# Patient Record
Sex: MALE | Race: OTHER | HISPANIC OR LATINO | URBAN - METROPOLITAN AREA
[De-identification: names, ages, dates, MRNs, and addresses within clinical notes are randomized per-mention and may not be internally consistent; named-entity substitution may affect disease eponyms.]

---

## 2017-12-28 ENCOUNTER — EMERGENCY (EMERGENCY)
Facility: HOSPITAL | Age: 37
LOS: 1 days | Discharge: HOME | End: 2017-12-28
Attending: EMERGENCY MEDICINE | Admitting: EMERGENCY MEDICINE
Payer: COMMERCIAL

## 2017-12-28 VITALS
HEART RATE: 104 BPM | OXYGEN SATURATION: 98 % | RESPIRATION RATE: 15 BRPM | SYSTOLIC BLOOD PRESSURE: 137 MMHG | TEMPERATURE: 99 F | DIASTOLIC BLOOD PRESSURE: 88 MMHG

## 2017-12-28 PROCEDURE — 99284 EMERGENCY DEPT VISIT MOD MDM: CPT

## 2017-12-28 RX ORDER — ASPIRIN/CALCIUM CARB/MAGNESIUM 324 MG
325 TABLET ORAL ONCE
Qty: 0 | Refills: 0 | Status: COMPLETED | OUTPATIENT
Start: 2017-12-28 | End: 2017-12-28

## 2017-12-28 RX ADMIN — Medication 325 MILLIGRAM(S): at 23:30

## 2017-12-28 NOTE — ED ADULT TRIAGE NOTE - CHIEF COMPLAINT QUOTE
Pt c/o L sided rib pain starting the 25th of December with cough for approx 1.5 weeks.  Pt denies any SOB.  Respirations even and unlabored on room air.  Pt denies any dizziness/lightheadedness.  Pt appears comfortable.  Denies any PHMx.

## 2017-12-29 VITALS
RESPIRATION RATE: 15 BRPM | OXYGEN SATURATION: 99 % | DIASTOLIC BLOOD PRESSURE: 73 MMHG | SYSTOLIC BLOOD PRESSURE: 123 MMHG | TEMPERATURE: 99 F | HEART RATE: 92 BPM

## 2017-12-29 LAB
ALBUMIN SERPL ELPH-MCNC: 4.5 G/DL — SIGNIFICANT CHANGE UP (ref 3.3–5)
ALP SERPL-CCNC: 90 U/L — SIGNIFICANT CHANGE UP (ref 40–120)
ALT FLD-CCNC: 84 U/L — HIGH (ref 4–41)
AST SERPL-CCNC: 38 U/L — SIGNIFICANT CHANGE UP (ref 4–40)
BASOPHILS # BLD AUTO: 0.04 K/UL — SIGNIFICANT CHANGE UP (ref 0–0.2)
BASOPHILS NFR BLD AUTO: 0.4 % — SIGNIFICANT CHANGE UP (ref 0–2)
BILIRUB SERPL-MCNC: 0.4 MG/DL — SIGNIFICANT CHANGE UP (ref 0.2–1.2)
BUN SERPL-MCNC: 18 MG/DL — SIGNIFICANT CHANGE UP (ref 7–23)
CALCIUM SERPL-MCNC: 9.4 MG/DL — SIGNIFICANT CHANGE UP (ref 8.4–10.5)
CHLORIDE SERPL-SCNC: 102 MMOL/L — SIGNIFICANT CHANGE UP (ref 98–107)
CK MB BLD-MCNC: 1.33 NG/ML — SIGNIFICANT CHANGE UP (ref 1–6.6)
CK MB BLD-MCNC: SIGNIFICANT CHANGE UP (ref 0–2.5)
CK SERPL-CCNC: 126 U/L — SIGNIFICANT CHANGE UP (ref 30–200)
CO2 SERPL-SCNC: 24 MMOL/L — SIGNIFICANT CHANGE UP (ref 22–31)
CREAT SERPL-MCNC: 0.84 MG/DL — SIGNIFICANT CHANGE UP (ref 0.5–1.3)
EOSINOPHIL # BLD AUTO: 0.86 K/UL — HIGH (ref 0–0.5)
EOSINOPHIL NFR BLD AUTO: 8.4 % — HIGH (ref 0–6)
GLUCOSE SERPL-MCNC: 121 MG/DL — HIGH (ref 70–99)
HCT VFR BLD CALC: 45.1 % — SIGNIFICANT CHANGE UP (ref 39–50)
HGB BLD-MCNC: 15.2 G/DL — SIGNIFICANT CHANGE UP (ref 13–17)
IMM GRANULOCYTES # BLD AUTO: 0.06 # — SIGNIFICANT CHANGE UP
IMM GRANULOCYTES NFR BLD AUTO: 0.6 % — SIGNIFICANT CHANGE UP (ref 0–1.5)
LYMPHOCYTES # BLD AUTO: 2.88 K/UL — SIGNIFICANT CHANGE UP (ref 1–3.3)
LYMPHOCYTES # BLD AUTO: 28.1 % — SIGNIFICANT CHANGE UP (ref 13–44)
MCHC RBC-ENTMCNC: 30.5 PG — SIGNIFICANT CHANGE UP (ref 27–34)
MCHC RBC-ENTMCNC: 33.7 % — SIGNIFICANT CHANGE UP (ref 32–36)
MCV RBC AUTO: 90.4 FL — SIGNIFICANT CHANGE UP (ref 80–100)
MONOCYTES # BLD AUTO: 1.06 K/UL — HIGH (ref 0–0.9)
MONOCYTES NFR BLD AUTO: 10.3 % — SIGNIFICANT CHANGE UP (ref 2–14)
NEUTROPHILS # BLD AUTO: 5.36 K/UL — SIGNIFICANT CHANGE UP (ref 1.8–7.4)
NEUTROPHILS NFR BLD AUTO: 52.2 % — SIGNIFICANT CHANGE UP (ref 43–77)
NRBC # FLD: 0 — SIGNIFICANT CHANGE UP
PLATELET # BLD AUTO: 322 K/UL — SIGNIFICANT CHANGE UP (ref 150–400)
PMV BLD: 11.3 FL — SIGNIFICANT CHANGE UP (ref 7–13)
POTASSIUM SERPL-MCNC: 3.8 MMOL/L — SIGNIFICANT CHANGE UP (ref 3.5–5.3)
POTASSIUM SERPL-SCNC: 3.8 MMOL/L — SIGNIFICANT CHANGE UP (ref 3.5–5.3)
PROT SERPL-MCNC: 7.9 G/DL — SIGNIFICANT CHANGE UP (ref 6–8.3)
RBC # BLD: 4.99 M/UL — SIGNIFICANT CHANGE UP (ref 4.2–5.8)
RBC # FLD: 13.7 % — SIGNIFICANT CHANGE UP (ref 10.3–14.5)
SODIUM SERPL-SCNC: 141 MMOL/L — SIGNIFICANT CHANGE UP (ref 135–145)
TROPONIN T SERPL-MCNC: < 0.06 NG/ML — SIGNIFICANT CHANGE UP (ref 0–0.06)
WBC # BLD: 10.26 K/UL — SIGNIFICANT CHANGE UP (ref 3.8–10.5)
WBC # FLD AUTO: 10.26 K/UL — SIGNIFICANT CHANGE UP (ref 3.8–10.5)

## 2017-12-29 PROCEDURE — 71020: CPT | Mod: 26

## 2017-12-29 NOTE — ED PROVIDER NOTE - PROGRESS NOTE DETAILS
BRAYDEN Mcbride - bedside sono negative for pericardial effusion. pt states chest pain feels better with aspirin, not as sore. pt amenable for d/c with pcp and cards follow up.

## 2017-12-29 NOTE — ED PROVIDER NOTE - OBJECTIVE STATEMENT
38 y/o male with pmhx of rheumatic fever 4 years ago with admission, nonsmoker, presents to ED for constant left "rib pain" x 3 days. Pt states he is getting over a cold and cough for past week. Dry cough, getting better. Left sided chest pain, non-radiating, nonpleuritic, nonexertional, worse with movement and twisting his body, states feels sore and like he has a bruise. No trauma. No family hx of MI, never had to get a cardiac work up. No fever, chills, nasal congestion, sore throat, night sweats, hemoptysis, hx of TB, palpitations, SOB, WITT, orthopnea, abd pain, n/v/d, diaphoresis, headache, weakness, numbness, ataxia, joint pain, rashes. No recent travel, surgeries, hx of pe or dvt, calf pain/swelling, immobilization.

## 2017-12-29 NOTE — ED PROVIDER NOTE - PLAN OF CARE
Take Ibuprofen 600mg (three over the counter pills) every 8 hours as needed for pain. Apply warm compresses. Rest, no heavy lifting. Light walking. Follow up with Cardiology within 24-48 hours, referral list given. See your primary care provider, take copy of results with you. Return to ER for any new or worsening symptoms, fever, chills, join pain, rash, sore throat, shortness of breathe, chest pain, vomiting, weakness or any other concerns.

## 2017-12-29 NOTE — ED PROVIDER NOTE - ATTENDING CONTRIBUTION TO CARE
agree with PA note  37 yr old male with remote hx of rheumatic fever presents with left rib pain.  STates worse with movement  Pt is PERC negative.  Denies fever.  Pt recently with URI type symptoms including cough.    PE: well appearing; vitals wnl; no rash; CTAB/L; s1 s2 no m/r/g abd soft/NT/ND ext: no edema    CXR and EKG wnl  bedside echo no effusion

## 2017-12-29 NOTE — ED PROVIDER NOTE - CARE PLAN
Principal Discharge DX:	Rib pain  Instructions for follow-up, activity and diet:	Take Ibuprofen 600mg (three over the counter pills) every 8 hours as needed for pain. Apply warm compresses. Rest, no heavy lifting. Light walking. Follow up with Cardiology within 24-48 hours, referral list given. See your primary care provider, take copy of results with you. Return to ER for any new or worsening symptoms, fever, chills, join pain, rash, sore throat, shortness of breathe, chest pain, vomiting, weakness or any other concerns.

## 2017-12-29 NOTE — ED PROVIDER NOTE - MEDICAL DECISION MAKING DETAILS
likely costochondritis, low heart score, does not meet marie' criteria for rheumatic fever. plan: aspirin, CEx 1, ekg, labs, reassess. will continue to monitor.

## 2017-12-29 NOTE — ED PROVIDER NOTE - RESPIRATORY, MLM
Breath sounds clear and equal bilaterally. No rales, rhonchi or wheezing b/l. +reproducible left rib pain localized, no redness or swelling, no bruising